# Patient Record
Sex: MALE | Race: WHITE | NOT HISPANIC OR LATINO | Employment: FULL TIME | URBAN - METROPOLITAN AREA
[De-identification: names, ages, dates, MRNs, and addresses within clinical notes are randomized per-mention and may not be internally consistent; named-entity substitution may affect disease eponyms.]

---

## 2021-06-02 ENCOUNTER — HOSPITAL ENCOUNTER (OUTPATIENT)
Dept: ULTRASOUND IMAGING | Facility: CLINIC | Age: 31
Discharge: HOME | End: 2021-06-02
Payer: COMMERCIAL

## 2021-06-02 DIAGNOSIS — L98.9 HARD SKIN LESION: ICD-10-CM

## 2022-01-29 ENCOUNTER — APPOINTMENT (OUTPATIENT)
Dept: GENERAL RADIOLOGY | Facility: CLINIC | Age: 32
End: 2022-01-29
Attending: PHYSICIAN ASSISTANT
Payer: OTHER MISCELLANEOUS

## 2022-01-29 ENCOUNTER — HOSPITAL ENCOUNTER (EMERGENCY)
Facility: CLINIC | Age: 32
Discharge: HOME | End: 2022-01-29
Attending: PHYSICIAN ASSISTANT | Admitting: PHYSICIAN ASSISTANT
Payer: OTHER MISCELLANEOUS

## 2022-01-29 VITALS
HEIGHT: 70 IN | SYSTOLIC BLOOD PRESSURE: 141 MMHG | RESPIRATION RATE: 16 BRPM | TEMPERATURE: 97.8 F | HEART RATE: 69 BPM | DIASTOLIC BLOOD PRESSURE: 89 MMHG | WEIGHT: 200 LBS | OXYGEN SATURATION: 99 % | BODY MASS INDEX: 28.63 KG/M2

## 2022-01-29 DIAGNOSIS — S93.401A SPRAIN OF RIGHT ANKLE, UNSPECIFIED LIGAMENT, INITIAL ENCOUNTER: ICD-10-CM

## 2022-01-29 PROCEDURE — 73630 X-RAY EXAM OF FOOT: CPT | Mod: RT

## 2022-01-29 PROCEDURE — 99284 EMERGENCY DEPT VISIT MOD MDM: CPT | Mod: 25

## 2022-01-29 PROCEDURE — 73610 X-RAY EXAM OF ANKLE: CPT | Mod: RT

## 2022-01-29 PROCEDURE — 250N000013 HC RX MED GY IP 250 OP 250 PS 637: Performed by: PHYSICIAN ASSISTANT

## 2022-01-29 PROCEDURE — 29515 APPLICATION SHORT LEG SPLINT: CPT | Mod: RT

## 2022-01-29 RX ORDER — ACETAMINOPHEN 500 MG
500 TABLET ORAL ONCE
Status: COMPLETED | OUTPATIENT
Start: 2022-01-29 | End: 2022-01-29

## 2022-01-29 RX ADMIN — ACETAMINOPHEN 500 MG: 500 TABLET ORAL at 11:18

## 2022-01-29 ASSESSMENT — ENCOUNTER SYMPTOMS
WEAKNESS: 0
HEADACHES: 0
ARTHRALGIAS: 1
NUMBNESS: 0

## 2022-01-29 ASSESSMENT — MIFFLIN-ST. JEOR
SCORE: 1863.44
SCORE: 1863.44

## 2022-01-29 NOTE — Clinical Note
Nico Greenwood was seen and treated in our emergency department on 1/29/2022.  He may return to work on 02/05/2022.  Off work due to ankle injury.      If you have any questions or concerns, please don't hesitate to call.      Beverly Denis PA-C

## 2022-01-29 NOTE — ED PROVIDER NOTES
"  History   Chief Complaint:  Ankle Pain     The history is provided by the patient and medical records.      Nico Greenwood is a 31 year old male who presents with right ankle pain after an injury at work. The patient works for the [employer]. The patient states he was stepping out of an ambulance when he slipped on ice and rolled his right ankle outward. He is able to bear some weight on his right leg. He endorses tenderness to palpation of the lateral and medial aspect of his right ankle. He denies pain to his right knee. He states he has injured this foot in the past, however has not had any previous surgeries. He denies hitting his head or any other injuries.     Review of Systems   Musculoskeletal: Positive for arthralgias (right ankle).   Neurological: Negative for syncope, weakness, numbness and headaches.     Allergies:  Sulfa Drugs    Medications:  The patient is not currently taking any prescribed medications.    Past Medical History:     Asthma       Past Surgical History:    Tonsillectomy  [place] teeth extraction     Social History:  The patient was accompanied to the emergency department by his coworker.  He works at [employer].     Physical Exam     Patient Vitals for the past 24 hrs:   BP Temp Temp src Pulse Resp SpO2 Height Weight   01/29/22 1051 (!) 141/89 97.8  F (36.6  C) Temporal 69 16 99 % 1.778 m (5' 10\") 90.7 kg (200 lb)   01/29/22 1040 (!) 152/84 97.4  F (36.3  C) Temporal 81 16 98 % 1.778 m (5' 10\") 90.7 kg (200 lb)     Physical Exam  Vitals and nursing note reviewed.   Constitutional:       General: He is not in acute distress.     Appearance: Normal appearance. He is not ill-appearing, toxic-appearing or diaphoretic.   HENT:      Head: Normocephalic.      Right Ear: External ear normal.      Left Ear: External ear normal.      Mouth/Throat:      Comments: Mask in place.   Eyes:      Conjunctiva/sclera: Conjunctivae normal.   Pulmonary:      Effort: Pulmonary effort is normal. "   Musculoskeletal:         General: Swelling and tenderness present. Normal range of motion.      Cervical back: Normal range of motion.      Comments: RLE: No open wound.  Mild swelling to the ankle.  TTP to med and lat ankle, lat >med.  Can range ankle, sore. Achilles NT and appears intact. Mild TTP to lateral MTPs.  No TTP over proximal lower leg.  Ranging knee with ease.  Wiggles toes.  Sensation grossly intact to light touch. Brisk cap refill and has 2+ DP pulse.    Skin:     General: Skin is warm.      Capillary Refill: Capillary refill takes less than 2 seconds.   Neurological:      General: No focal deficit present.      Mental Status: He is alert.   Psychiatric:         Mood and Affect: Mood normal.         Behavior: Behavior normal.         Thought Content: Thought content normal.         Judgment: Judgment normal.       Emergency Department Course   Imaging:  Foot  XR, G/E 3 views, right   Final Result   IMPRESSION: Normal joint spaces and alignment. No fracture. Small ankle joint effusion.      Ankle XR, G/E 3 views, right   Final Result   IMPRESSION: No acute fracture or malalignment. There is normal joint spacing. The ankle mortise is congruent. Small tibiotalar joint effusion.      Report per radiology     Emergency Department Course:         Reviewed:  I reviewed nursing notes, vitals, past medical history and Care Everywhere    Assessments:  1046 I obtained history and examined the patient as noted above.   1113 I rechecked the patient and explained foot and ankle x-ray findings.     Interventions:  1118 Tylenol 500 mg PO    Disposition:  The patient was discharged to home.     Impression & Plan   Medical Decision Makin y/o male presents for right ankle injury sustained just PTA while at work (fire).  Closed injury, N/V intact. Not suspected this is veronika neuve injury or achilles tendon rupture. Will check right ankle and foot xray to eval for Fx vs sprain.  Tylenol for pain. He is  comfortable with plan.     Update: No Fx/dislocation on xrays.  Discussed with pt will treat for sprain.  Ortho walking boot provided, advised to wear until recheck but can remove to sleep and shower.  Crutches provided as well and advised can WBAT.  Discussed ideal to F/U with ortho in a weeks time and can connect with employer regarding need for F/U with specific [facility] clinic as well. OTC motrin/tylenol prn. Pt educated on S/S that should prompt ED re-eval.  Questions answered. Verbalized understanding. Comfortable with plan and appreciative.     Diagnosis:    ICD-10-CM    1. Sprain of right ankle, unspecified ligament, initial encounter  S93.401A      Scribe Disclosure:  I, Gini Romo, am serving as a scribe at 10:46 AM on 1/29/2022 to document services personally performed by Beverly Denis PA-C based on my observations and the provider's statements to me.     Beverly Denis PA-C  01/29/22 1125

## 2022-01-29 NOTE — ED TRIAGE NOTES
Pt works for [employer]. Pt notes stepping out of the ambulance and slipping on the ice. Pt notes rolling his R ankle outward. Pt states he is able to put pressure on it when standing but only a small amount.

## 2022-01-29 NOTE — DISCHARGE INSTRUCTIONS
-Wear the ortho walking boot until recheck.  May remove to sleep and shower  -Ice packs to your ankle in 20 minute intervals can help with pain/swelliing  -Elevate your ankle above your heart whenever possible  -Motrin and/or Tylenol as needed for discomfort.  These medications are available over the counter, use as directed on the label.